# Patient Record
Sex: FEMALE | Race: WHITE | NOT HISPANIC OR LATINO | Employment: OTHER | URBAN - METROPOLITAN AREA
[De-identification: names, ages, dates, MRNs, and addresses within clinical notes are randomized per-mention and may not be internally consistent; named-entity substitution may affect disease eponyms.]

---

## 2017-07-19 NOTE — PATIENT DISCUSSION
CHALAZION OD:  PROCEED WITH INCISION AND DRAINAGE. PRESCRIBE DOXYCYCLINE 100MG PO BID AND MAXITROL TAVIA QHS FOR 2 WEEKS.  RETURN FOR FOLLOW UP

## 2017-07-19 NOTE — PATIENT DISCUSSION
MEIBOMIAN GLAND DYSFUNCTION, OU:  PRESCRIBE WARM COMPRESSES AND EYELID SCRUBS QD-BID, ARTIFICIAL TEARS BID-QID, THE DAILY INTAKE OF OMEGA-3 FATTY ACIDS. WILL CONSIDER LIPIFLOW TREATMENT NEXT VISIT IF NOT RESPONSIVE TO TREATMENT OR IF SYMPTOMS PERSIST. RETURN FOR FOLLOW-UP AS SCHEDULED.

## 2017-07-19 NOTE — PATIENT DISCUSSION
New Prescription: neomycin-polymyxin-dexameth (neomycin-polymyxin-dexameth): ointment: 3.5-10,000-0.1 mg-unit/g-% a small amount at bedtime into both eyes 07-

## 2017-07-19 NOTE — PATIENT DISCUSSION
New Prescription: doxycycline hyclate (doxycycline hyclate): tablet: 100 mg 1 tablet twice a day by mouth 07-

## 2018-08-08 ENCOUNTER — PREPPED CHART (OUTPATIENT)
Dept: URBAN - METROPOLITAN AREA CLINIC 46 | Facility: CLINIC | Age: 58
End: 2018-08-08

## 2018-08-08 ASSESSMENT — VISUAL ACUITY
OD_CC: J1
OD_SC: 20/<400
OS_CC: J1
OD_SC: J1+
OS_SC: J1
OS_CC: 20/30-1
OD_CC: 20/20-1
OS_SC: 20/<400

## 2018-08-08 ASSESSMENT — TONOMETRY
OS_IOP_MMHG: 19
OD_IOP_MMHG: 1

## 2018-08-23 ENCOUNTER — ESTABLISHED COMPREHENSIVE EXAM (OUTPATIENT)
Dept: URBAN - METROPOLITAN AREA CLINIC 46 | Facility: CLINIC | Age: 58
End: 2018-08-23

## 2018-08-23 DIAGNOSIS — H52.13: ICD-10-CM

## 2018-08-23 DIAGNOSIS — H52.7: ICD-10-CM

## 2018-08-23 DIAGNOSIS — E11.9: ICD-10-CM

## 2018-08-23 DIAGNOSIS — Z79.4: ICD-10-CM

## 2018-08-23 PROCEDURE — 92015 DETERMINE REFRACTIVE STATE: CPT

## 2018-08-23 PROCEDURE — 92014 COMPRE OPH EXAM EST PT 1/>: CPT

## 2018-08-23 ASSESSMENT — TONOMETRY
OD_IOP_MMHG: 18
OS_IOP_MMHG: 18

## 2018-08-23 ASSESSMENT — VISUAL ACUITY
OS_SC: J1
OD_SC: 20/400
OD_SC: J1
OD_CC: 20/25
OS_CC: 20/25
OS_CC: J1
OD_CC: J3
OS_SC: 20/400

## 2018-11-06 NOTE — PATIENT DISCUSSION
11/06/2018OS-1.00+[id number]+2.5020/20&nbsp;SN &nbsp; &nbsp; [facility]

## 2019-12-05 ENCOUNTER — ESTABLISHED COMPREHENSIVE EXAM (OUTPATIENT)
Dept: URBAN - METROPOLITAN AREA CLINIC 46 | Facility: CLINIC | Age: 59
End: 2019-12-05

## 2019-12-05 DIAGNOSIS — H52.7: ICD-10-CM

## 2019-12-05 DIAGNOSIS — E11.9: ICD-10-CM

## 2019-12-05 DIAGNOSIS — H25.813: ICD-10-CM

## 2019-12-05 DIAGNOSIS — H52.13: ICD-10-CM

## 2019-12-05 PROCEDURE — 92015 DETERMINE REFRACTIVE STATE: CPT

## 2019-12-05 PROCEDURE — 92014 COMPRE OPH EXAM EST PT 1/>: CPT

## 2019-12-05 ASSESSMENT — VISUAL ACUITY
OD_CC: J3
OS_SC: 20/>400
OS_SC: J5
OD_SC: J3-1
OD_CC: 20/30+2
OS_CC: 20/30
OS_CC: J2
OD_SC: 20/>400

## 2019-12-05 ASSESSMENT — TONOMETRY
OS_IOP_MMHG: 19
OD_IOP_MMHG: 19

## 2021-02-25 ENCOUNTER — ESTABLISHED COMPREHENSIVE EXAM (OUTPATIENT)
Dept: URBAN - METROPOLITAN AREA CLINIC 46 | Facility: CLINIC | Age: 61
End: 2021-02-25

## 2021-02-25 DIAGNOSIS — Z79.4: ICD-10-CM

## 2021-02-25 DIAGNOSIS — E11.9: ICD-10-CM

## 2021-02-25 DIAGNOSIS — H52.13: ICD-10-CM

## 2021-02-25 DIAGNOSIS — H25.813: ICD-10-CM

## 2021-02-25 PROCEDURE — 92014 COMPRE OPH EXAM EST PT 1/>: CPT

## 2021-02-25 PROCEDURE — 92015 DETERMINE REFRACTIVE STATE: CPT

## 2021-02-25 ASSESSMENT — VISUAL ACUITY
OS_CC: 20/30-1
OD_CC: J3
OD_SC: 20/400
OS_CC: J3
OD_SC: J1+ @ 8"
OS_SC: 20/400
OD_CC: 20/30
OS_SC: J1+ @ 6"

## 2021-02-25 ASSESSMENT — TONOMETRY
OS_IOP_MMHG: 16
OD_IOP_MMHG: 16

## 2022-09-18 NOTE — PATIENT DISCUSSION
Excise Chalazion Single: RUL no back pain, no gout, no musculoskeletal pain, no neck pain, and no weakness.

## 2023-11-01 ENCOUNTER — EMERGENCY VISIT (OUTPATIENT)
Dept: URBAN - METROPOLITAN AREA CLINIC 46 | Facility: CLINIC | Age: 63
End: 2023-11-01

## 2023-11-01 DIAGNOSIS — H43.813: ICD-10-CM

## 2023-11-01 DIAGNOSIS — H40.023: ICD-10-CM

## 2023-11-01 DIAGNOSIS — E11.9: ICD-10-CM

## 2023-11-01 DIAGNOSIS — H25.813: ICD-10-CM

## 2023-11-01 DIAGNOSIS — Z79.4: ICD-10-CM

## 2023-11-01 DIAGNOSIS — H04.123: ICD-10-CM

## 2023-11-01 PROCEDURE — 92012 INTRM OPH EXAM EST PATIENT: CPT

## 2023-11-01 ASSESSMENT — VISUAL ACUITY
OS_PH: 20/30
OD_CC: 20/30
OS_CC: 20/50
OU_CC: 20/30

## 2023-11-01 ASSESSMENT — TONOMETRY
OS_IOP_MMHG: 14
OD_IOP_MMHG: 14

## 2024-01-04 ENCOUNTER — COMPREHENSIVE EXAM (OUTPATIENT)
Dept: URBAN - METROPOLITAN AREA CLINIC 46 | Facility: CLINIC | Age: 64
End: 2024-01-04

## 2024-01-04 DIAGNOSIS — Z79.4: ICD-10-CM

## 2024-01-04 DIAGNOSIS — H25.813: ICD-10-CM

## 2024-01-04 DIAGNOSIS — H40.023: ICD-10-CM

## 2024-01-04 DIAGNOSIS — H04.123: ICD-10-CM

## 2024-01-04 DIAGNOSIS — H43.813: ICD-10-CM

## 2024-01-04 DIAGNOSIS — H52.13: ICD-10-CM

## 2024-01-04 DIAGNOSIS — E11.9: ICD-10-CM

## 2024-01-04 PROCEDURE — 92015 DETERMINE REFRACTIVE STATE: CPT

## 2024-01-04 PROCEDURE — 92014 COMPRE OPH EXAM EST PT 1/>: CPT

## 2024-01-04 ASSESSMENT — VISUAL ACUITY
OS_SC: 20/400
OD_SC: J1
OS_CC: J2
OU_CC: J2
OS_SC: J1
OD_CC: 20/25
OD_CC: J2
OU_CC: 20/25+2
OU_SC: J1+
OS_CC: 20/30
OD_SC: 20/400
OU_SC: 20/400

## 2024-01-04 ASSESSMENT — TONOMETRY
OS_IOP_MMHG: 14
OD_IOP_MMHG: 14